# Patient Record
Sex: FEMALE | Race: WHITE | Employment: OTHER | URBAN - METROPOLITAN AREA
[De-identification: names, ages, dates, MRNs, and addresses within clinical notes are randomized per-mention and may not be internally consistent; named-entity substitution may affect disease eponyms.]

---

## 2017-06-07 PROBLEM — M25.531 WRIST PAIN, RIGHT: Status: ACTIVE | Noted: 2017-06-07

## 2017-07-31 PROCEDURE — 81001 URINALYSIS AUTO W/SCOPE: CPT | Performed by: PEDIATRICS

## 2017-07-31 PROCEDURE — 87086 URINE CULTURE/COLONY COUNT: CPT | Performed by: PEDIATRICS

## 2017-08-15 PROCEDURE — 87081 CULTURE SCREEN ONLY: CPT | Performed by: PEDIATRICS

## 2017-08-21 ENCOUNTER — ANESTHESIA EVENT (OUTPATIENT)
Dept: SURGERY | Facility: HOSPITAL | Age: 62
DRG: 460 | End: 2017-08-21
Payer: COMMERCIAL

## 2017-08-21 RX ORDER — GABAPENTIN 600 MG/1
600 TABLET ORAL ONCE
Status: COMPLETED | OUTPATIENT
Start: 2017-08-21 | End: 2017-08-22

## 2017-08-22 ENCOUNTER — SURGERY (OUTPATIENT)
Age: 62
End: 2017-08-22

## 2017-08-22 ENCOUNTER — HOSPITAL ENCOUNTER (INPATIENT)
Facility: HOSPITAL | Age: 62
LOS: 2 days | Discharge: HOME | DRG: 460 | End: 2017-08-24
Attending: ORTHOPAEDIC SURGERY | Admitting: ORTHOPAEDIC SURGERY
Payer: COMMERCIAL

## 2017-08-22 ENCOUNTER — ANESTHESIA (OUTPATIENT)
Dept: SURGERY | Facility: HOSPITAL | Age: 62
DRG: 460 | End: 2017-08-22
Payer: COMMERCIAL

## 2017-08-22 ENCOUNTER — APPOINTMENT (OUTPATIENT)
Dept: GENERAL RADIOLOGY | Facility: HOSPITAL | Age: 62
DRG: 460 | End: 2017-08-22
Attending: ORTHOPAEDIC SURGERY
Payer: COMMERCIAL

## 2017-08-22 DIAGNOSIS — D64.9 ANEMIA, UNSPECIFIED TYPE: Primary | ICD-10-CM

## 2017-08-22 PROBLEM — M43.10 ACQUIRED SPONDYLOLISTHESIS: Status: ACTIVE | Noted: 2017-08-22

## 2017-08-22 LAB
ERYTHROCYTE [DISTWIDTH] IN BLOOD BY AUTOMATED COUNT: 12.2 % (ref 11.5–16)
HCT VFR BLD AUTO: 37.4 % (ref 34–50)
HGB BLD-MCNC: 12.5 G/DL (ref 12–16)
MCH RBC QN AUTO: 30.5 PG (ref 27–33.2)
MCHC RBC AUTO-ENTMCNC: 33.4 G/DL (ref 31–37)
MCV RBC AUTO: 91.2 FL (ref 81–100)
PLATELET # BLD AUTO: 257 10(3)UL (ref 150–450)
RBC # BLD AUTO: 4.1 X10(6)UL (ref 3.8–5.1)
RED CELL DISTRIBUTION WIDTH-SD: 41.1 FL (ref 35.1–46.3)
WBC # BLD AUTO: 11.2 X10(3) UL (ref 4–13)

## 2017-08-22 PROCEDURE — 4A11X4G MONITORING OF PERIPHERAL NERVOUS ELECTRICAL ACTIVITY, INTRAOPERATIVE, EXTERNAL APPROACH: ICD-10-PCS | Performed by: ORTHOPAEDIC SURGERY

## 2017-08-22 PROCEDURE — 0SB40ZZ EXCISION OF LUMBOSACRAL DISC, OPEN APPROACH: ICD-10-PCS | Performed by: ORTHOPAEDIC SURGERY

## 2017-08-22 PROCEDURE — 95940 IONM IN OPERATNG ROOM 15 MIN: CPT | Performed by: ORTHOPAEDIC SURGERY

## 2017-08-22 PROCEDURE — 0SG30AJ FUSION OF LUMBOSACRAL JOINT WITH INTERBODY FUSION DEVICE, POSTERIOR APPROACH, ANTERIOR COLUMN, OPEN APPROACH: ICD-10-PCS | Performed by: ORTHOPAEDIC SURGERY

## 2017-08-22 PROCEDURE — 95938 SOMATOSENSORY TESTING: CPT | Performed by: ORTHOPAEDIC SURGERY

## 2017-08-22 PROCEDURE — 95861 NEEDLE EMG 2 EXTREMITIES: CPT | Performed by: ORTHOPAEDIC SURGERY

## 2017-08-22 PROCEDURE — 76000 FLUOROSCOPY <1 HR PHYS/QHP: CPT | Performed by: ORTHOPAEDIC SURGERY

## 2017-08-22 PROCEDURE — 0SG00AJ FUSION OF LUMBAR VERTEBRAL JOINT WITH INTERBODY FUSION DEVICE, POSTERIOR APPROACH, ANTERIOR COLUMN, OPEN APPROACH: ICD-10-PCS | Performed by: ORTHOPAEDIC SURGERY

## 2017-08-22 PROCEDURE — 85027 COMPLETE CBC AUTOMATED: CPT | Performed by: ORTHOPAEDIC SURGERY

## 2017-08-22 DEVICE — IMPLANTABLE DEVICE: Type: IMPLANTABLE DEVICE | Site: BACK | Status: FUNCTIONAL

## 2017-08-22 DEVICE — IMPLANTABLE DEVICE: Type: IMPLANTABLE DEVICE | Status: FUNCTIONAL

## 2017-08-22 DEVICE — SCREW SET SPNE VRST GRY: Type: IMPLANTABLE DEVICE | Status: FUNCTIONAL

## 2017-08-22 DEVICE — GRAFT BN DMNR FBR 30ML: Type: IMPLANTABLE DEVICE | Site: BACK | Status: FUNCTIONAL

## 2017-08-22 RX ORDER — NALBUPHINE HCL 10 MG/ML
2.5 AMPUL (ML) INJECTION EVERY 4 HOURS PRN
Status: DISCONTINUED | OUTPATIENT
Start: 2017-08-22 | End: 2017-08-23

## 2017-08-22 RX ORDER — DIAZEPAM 10 MG/1
10 TABLET ORAL EVERY 8 HOURS PRN
Status: DISCONTINUED | OUTPATIENT
Start: 2017-08-22 | End: 2017-08-24

## 2017-08-22 RX ORDER — GABAPENTIN 600 MG/1
TABLET ORAL
Status: COMPLETED
Start: 2017-08-22 | End: 2017-08-22

## 2017-08-22 RX ORDER — POLYETHYLENE GLYCOL 3350 17 G/17G
17 POWDER, FOR SOLUTION ORAL DAILY PRN
Status: DISCONTINUED | OUTPATIENT
Start: 2017-08-22 | End: 2017-08-24

## 2017-08-22 RX ORDER — METOCLOPRAMIDE HYDROCHLORIDE 5 MG/ML
10 INJECTION INTRAMUSCULAR; INTRAVENOUS EVERY 6 HOURS PRN
Status: DISCONTINUED | OUTPATIENT
Start: 2017-08-22 | End: 2017-08-24

## 2017-08-22 RX ORDER — OXYCODONE AND ACETAMINOPHEN 10; 325 MG/1; MG/1
1 TABLET ORAL EVERY 4 HOURS PRN
Status: DISCONTINUED | OUTPATIENT
Start: 2017-08-22 | End: 2017-08-24

## 2017-08-22 RX ORDER — CETIRIZINE HYDROCHLORIDE 10 MG/1
10 TABLET ORAL DAILY
Status: DISCONTINUED | OUTPATIENT
Start: 2017-08-23 | End: 2017-08-24

## 2017-08-22 RX ORDER — FLUTICASONE PROPIONATE 50 MCG
2 SPRAY, SUSPENSION (ML) NASAL DAILY
Status: DISCONTINUED | OUTPATIENT
Start: 2017-08-23 | End: 2017-08-24

## 2017-08-22 RX ORDER — ONDANSETRON 2 MG/ML
4 INJECTION INTRAMUSCULAR; INTRAVENOUS EVERY 6 HOURS PRN
Status: DISCONTINUED | OUTPATIENT
Start: 2017-08-23 | End: 2017-08-23

## 2017-08-22 RX ORDER — SODIUM CHLORIDE, SODIUM LACTATE, POTASSIUM CHLORIDE, CALCIUM CHLORIDE 600; 310; 30; 20 MG/100ML; MG/100ML; MG/100ML; MG/100ML
INJECTION, SOLUTION INTRAVENOUS CONTINUOUS
Status: DISCONTINUED | OUTPATIENT
Start: 2017-08-22 | End: 2017-08-24

## 2017-08-22 RX ORDER — DIPHENHYDRAMINE HCL 25 MG
25 CAPSULE ORAL EVERY 4 HOURS PRN
Status: DISCONTINUED | OUTPATIENT
Start: 2017-08-22 | End: 2017-08-23

## 2017-08-22 RX ORDER — LIOTHYRONINE SODIUM 5 UG/1
5 TABLET ORAL
Status: DISCONTINUED | OUTPATIENT
Start: 2017-08-23 | End: 2017-08-24

## 2017-08-22 RX ORDER — NALOXONE HYDROCHLORIDE 0.4 MG/ML
0.08 INJECTION, SOLUTION INTRAMUSCULAR; INTRAVENOUS; SUBCUTANEOUS
Status: DISCONTINUED | OUTPATIENT
Start: 2017-08-22 | End: 2017-08-23

## 2017-08-22 RX ORDER — OXYCODONE HCL 10 MG/1
10 TABLET, FILM COATED, EXTENDED RELEASE ORAL EVERY 12 HOURS
Qty: 60 TABLET | Refills: 0 | Status: SHIPPED | OUTPATIENT
Start: 2017-08-22 | End: 2017-08-29

## 2017-08-22 RX ORDER — ONDANSETRON 2 MG/ML
4 INJECTION INTRAMUSCULAR; INTRAVENOUS AS NEEDED
Status: DISCONTINUED | OUTPATIENT
Start: 2017-08-22 | End: 2017-08-22 | Stop reason: HOSPADM

## 2017-08-22 RX ORDER — NALOXONE HYDROCHLORIDE 0.4 MG/ML
80 INJECTION, SOLUTION INTRAMUSCULAR; INTRAVENOUS; SUBCUTANEOUS AS NEEDED
Status: DISCONTINUED | OUTPATIENT
Start: 2017-08-22 | End: 2017-08-22 | Stop reason: HOSPADM

## 2017-08-22 RX ORDER — OXYCODONE AND ACETAMINOPHEN 10; 325 MG/1; MG/1
1 TABLET ORAL EVERY 4 HOURS PRN
Qty: 80 TABLET | Refills: 1 | Status: SHIPPED | OUTPATIENT
Start: 2017-08-22 | End: 2017-08-29

## 2017-08-22 RX ORDER — BUPIVACAINE HYDROCHLORIDE AND EPINEPHRINE 5; 5 MG/ML; UG/ML
INJECTION, SOLUTION EPIDURAL; INTRACAUDAL; PERINEURAL AS NEEDED
Status: DISCONTINUED | OUTPATIENT
Start: 2017-08-22 | End: 2017-08-22 | Stop reason: HOSPADM

## 2017-08-22 RX ORDER — HYDROMORPHONE HYDROCHLORIDE 1 MG/ML
0.4 INJECTION, SOLUTION INTRAMUSCULAR; INTRAVENOUS; SUBCUTANEOUS EVERY 5 MIN PRN
Status: DISCONTINUED | OUTPATIENT
Start: 2017-08-22 | End: 2017-08-22 | Stop reason: HOSPADM

## 2017-08-22 RX ORDER — DULOXETIN HYDROCHLORIDE 30 MG/1
60 CAPSULE, DELAYED RELEASE ORAL NIGHTLY
Status: DISCONTINUED | OUTPATIENT
Start: 2017-08-22 | End: 2017-08-24

## 2017-08-22 RX ORDER — LEVOTHYROXINE SODIUM 0.1 MG/1
100 TABLET ORAL
Status: DISCONTINUED | OUTPATIENT
Start: 2017-08-23 | End: 2017-08-24

## 2017-08-22 RX ORDER — SENNOSIDES 8.6 MG
17.2 TABLET ORAL NIGHTLY
Status: DISCONTINUED | OUTPATIENT
Start: 2017-08-22 | End: 2017-08-24

## 2017-08-22 RX ORDER — SODIUM PHOSPHATE, DIBASIC AND SODIUM PHOSPHATE, MONOBASIC 7; 19 G/133ML; G/133ML
1 ENEMA RECTAL ONCE AS NEEDED
Status: DISCONTINUED | OUTPATIENT
Start: 2017-08-22 | End: 2017-08-24

## 2017-08-22 RX ORDER — HYDROMORPHONE HYDROCHLORIDE 1 MG/ML
INJECTION, SOLUTION INTRAMUSCULAR; INTRAVENOUS; SUBCUTANEOUS
Status: COMPLETED
Start: 2017-08-22 | End: 2017-08-22

## 2017-08-22 RX ORDER — GENTAMICIN SULFATE 40 MG/ML
INJECTION, SOLUTION INTRAMUSCULAR; INTRAVENOUS AS NEEDED
Status: DISCONTINUED | OUTPATIENT
Start: 2017-08-22 | End: 2017-08-22 | Stop reason: HOSPADM

## 2017-08-22 RX ORDER — ATORVASTATIN CALCIUM 20 MG/1
20 TABLET, FILM COATED ORAL NIGHTLY
Status: DISCONTINUED | OUTPATIENT
Start: 2017-08-22 | End: 2017-08-24

## 2017-08-22 RX ORDER — OXYCODONE HYDROCHLORIDE 10 MG/1
10 TABLET ORAL ONCE AS NEEDED
Status: ACTIVE | OUTPATIENT
Start: 2017-08-22 | End: 2017-08-22

## 2017-08-22 RX ORDER — LEVOTHYROXINE SODIUM 0.1 MG/1
100 TABLET ORAL
COMMUNITY
End: 2018-05-08

## 2017-08-22 RX ORDER — ONDANSETRON 2 MG/ML
4 INJECTION INTRAMUSCULAR; INTRAVENOUS EVERY 4 HOURS PRN
Status: DISPENSED | OUTPATIENT
Start: 2017-08-22 | End: 2017-08-23

## 2017-08-22 RX ORDER — DIPHENHYDRAMINE HYDROCHLORIDE 50 MG/ML
25 INJECTION INTRAMUSCULAR; INTRAVENOUS EVERY 4 HOURS PRN
Status: DISCONTINUED | OUTPATIENT
Start: 2017-08-22 | End: 2017-08-23

## 2017-08-22 RX ORDER — HYDROMORPHONE HYDROCHLORIDE 1 MG/ML
0.4 INJECTION, SOLUTION INTRAMUSCULAR; INTRAVENOUS; SUBCUTANEOUS EVERY 30 MIN PRN
Status: DISCONTINUED | OUTPATIENT
Start: 2017-08-22 | End: 2017-08-23

## 2017-08-22 RX ORDER — BISACODYL 10 MG
10 SUPPOSITORY, RECTAL RECTAL
Status: DISCONTINUED | OUTPATIENT
Start: 2017-08-22 | End: 2017-08-24

## 2017-08-22 RX ORDER — DOCUSATE SODIUM 100 MG/1
100 CAPSULE, LIQUID FILLED ORAL 2 TIMES DAILY
Status: DISCONTINUED | OUTPATIENT
Start: 2017-08-22 | End: 2017-08-24

## 2017-08-22 RX ORDER — METHYLPREDNISOLONE ACETATE 80 MG/ML
INJECTION, SUSPENSION INTRA-ARTICULAR; INTRALESIONAL; INTRAMUSCULAR; SOFT TISSUE AS NEEDED
Status: DISCONTINUED | OUTPATIENT
Start: 2017-08-22 | End: 2017-08-22 | Stop reason: HOSPADM

## 2017-08-22 RX ORDER — DIPHENHYDRAMINE HYDROCHLORIDE 50 MG/ML
12.5 INJECTION INTRAMUSCULAR; INTRAVENOUS EVERY 4 HOURS PRN
Status: DISCONTINUED | OUTPATIENT
Start: 2017-08-22 | End: 2017-08-23

## 2017-08-22 RX ORDER — CEFAZOLIN SODIUM 1 G/3ML
INJECTION, POWDER, FOR SOLUTION INTRAMUSCULAR; INTRAVENOUS
Status: DISCONTINUED | OUTPATIENT
Start: 2017-08-22 | End: 2017-08-22 | Stop reason: HOSPADM

## 2017-08-22 NOTE — ANESTHESIA POSTPROCEDURE EVALUATION
[address] Patient Status:  Surgery Admit   Age/Gender 60year old female MRN [id number]   Location [address] [facility] Attending Leon Sheldon MD   Hosp Day # 0 PCP Antonia Sheikh MD       Anesthesia

## 2017-08-22 NOTE — ANESTHESIA PREPROCEDURE EVALUATION
PRE-OP EVALUATION    Patient Name: Ruby Parks    Pre-op Diagnosis: SPINAL STENOSIS, SPONDHYLOLISTHOSIS    Procedure(s):  LAMINECTOMY AND FORAMINOTOMY DECOMPRESSION LUMBAR 4-5, LUMBAR 5/SACRUM 1 WITH POSTERIOR LATERAL INTERBODY FUSION WITH INSTRUMEN [] OxyCODONE HCl ER 30 MG Oral Tablet Extended Release 12 hour Abuse-Deterrent Take 1 tablet by mouth 2 (two) times daily. Disp: 60 each Rfl: 0   [DISCONTINUED] DULoxetine HCl 60 MG Oral Cap DR Particles Take 60 mg by mouth nightly.    Disp:  Rfl: 1 Quit date: 1/1/2004    Smokeless tobacco: Never Used    Alcohol use Yes    Comment: occasionally       Drug use: No     Available pre-op labs reviewed.     Lab Results  Component Value Date   WBC 6.50 07/31/2017   RBC 4.35 07/31/2017   HGB 13.6 07/31/2017

## 2017-08-22 NOTE — OPERATIVE REPORT
Operative Report    [address] Patient Status:  Surgery Admit    1955 MRN [id number]   Location [facility] Attending Mel Sanchez MD   Hosp Day # 0 PCP MD Elaine Munguia  20 We confirmed that he had quit smoking more than 8 weeks ago and will stay off nicotine for at least 6- 8 months postop. TECHNIQUE: The patient was brought to the operating room, underwent intubation. Bilateral TEDs, SCDs and monitoring leads were placed. dural sac and exiting nerve root with a nerve root retractor. The bone spurs and scar which were offending the exiting nerve rtoots were resected with a Kerrison.  I used a blunt mcdonald ball, currettes and bipolar electrocautery to mobiize the dural sac and dural leak either. I removed the tubular retractor. We obtained AP and lateral fluoroscopy, confirmed the L5S1 Level anatomy.  We irrigated after performing the skin incision over the segment, dissected through subcutaneous tissue with Bovie electr there was a void in the disc space. I used a laminaspreader gently at theL5 S1 interval and confirmed instability of the L5 [id number] segment.  Based on the  amount of facet resection on the right  ,100 percent, the disc pathology with a void in the disc pac tightened. We removed the tap extenders and then irrigated. We closed the fascia with single interrupted Vicryl running stitches, subcutaneous layer with 2-0 Vicryl, skin with Monocryl. We applied a sterile dressing.  Patient had strong ankle and dorsi plan

## 2017-08-22 NOTE — H&P
Spine Surgery H&P  Dr. Lore Heck Patient Status:  Surgery Admit    1955 MRN [id number]   Location [address] [place] [address] Attending Michael Arenas MD   Hosp Day # 0 PCP MD Princess Lion weight 185 lb 10 oz (84.2 kg), SpO2 99 %.       Intake/Output:  No intake or output data in the 24 hours ending 17 0732      Past Medical History:   Diagnosis Date   • Anesthesia complication     headache s/p    • Anxiety state    • Asthma hematuria. no increase in night urination no in-ablitity to completely empty bladder  NEURO: no balance problems, no seizure problems, no increased clumsiness, dropping things, no tremor,  no headaches/ migraines  PSYCHE: no depression, no anxiety.   HEMATO not palpable. Extremities:  No lower extremity edema noted. Without clubbing or cyanosis. Capillary refill < 2 seconds. Skin: Normal texture and turgor. Lymphatic:  No palpable cervical lymphadenopathy.   Neurologic: Cranial nerves II- XII are grossly

## 2017-08-22 NOTE — CONSULTS
[facility] Pharmacy Note:  Pain Consult    Yesenia Gaffney is a 60year old female started on Dilaudid PCA by Dr. Valente Perales. Pharmacy was consulted to review medication profile and to discontinue previously ordered narcotics and sedatives.     Medication pr

## 2017-08-22 NOTE — CONSULTS
[facility] Hospitalist Initial Consult       Reason for consult: Medical Management sp laminectomy and foraminotomy decompression L4-5, L5-S1 with posterior lateral interbody fusion with instrumentation      History of Present Illness: Patient is a 60year old fe Cigarettes    Quit date: 1/1/2004    Smokeless tobacco: Never Used    Alcohol use Yes    Comment: occasionally        Fam Hx  Family History   Problem Relation Age of Onset   • High Blood Pressure Father        Review of Systems  All 10 systems otherwise r posterior lateral interbody fusion with instrumentation  - Plan per ortho. Continue PT/OT. Pain is currently controlled. SCD's for DVT prophylaxis.    -CBC without post-operative anemia    HL  -statin    Hypothyroidism  -continue home meds    Allergies

## 2017-08-23 LAB
ERYTHROCYTE [DISTWIDTH] IN BLOOD BY AUTOMATED COUNT: 12.1 % (ref 11.5–16)
HCT VFR BLD AUTO: 37.9 % (ref 34–50)
HGB BLD-MCNC: 12.9 G/DL (ref 12–16)
MCH RBC QN AUTO: 30.4 PG (ref 27–33.2)
MCHC RBC AUTO-ENTMCNC: 34 G/DL (ref 31–37)
MCV RBC AUTO: 89.4 FL (ref 81–100)
PLATELET # BLD AUTO: 283 10(3)UL (ref 150–450)
RBC # BLD AUTO: 4.24 X10(6)UL (ref 3.8–5.1)
RED CELL DISTRIBUTION WIDTH-SD: 39.9 FL (ref 35.1–46.3)
WBC # BLD AUTO: 16.6 X10(3) UL (ref 4–13)

## 2017-08-23 PROCEDURE — 97165 OT EVAL LOW COMPLEX 30 MIN: CPT

## 2017-08-23 PROCEDURE — 97530 THERAPEUTIC ACTIVITIES: CPT

## 2017-08-23 PROCEDURE — 97161 PT EVAL LOW COMPLEX 20 MIN: CPT

## 2017-08-23 PROCEDURE — 85027 COMPLETE CBC AUTOMATED: CPT | Performed by: ORTHOPAEDIC SURGERY

## 2017-08-23 PROCEDURE — 97116 GAIT TRAINING THERAPY: CPT

## 2017-08-23 RX ORDER — OXYCODONE HCL 20 MG/1
20 TABLET, FILM COATED, EXTENDED RELEASE ORAL EVERY 12 HOURS
Status: DISCONTINUED | OUTPATIENT
Start: 2017-08-23 | End: 2017-08-23

## 2017-08-23 RX ORDER — OXYCODONE AND ACETAMINOPHEN 10; 325 MG/1; MG/1
2 TABLET ORAL EVERY 4 HOURS PRN
Status: DISCONTINUED | OUTPATIENT
Start: 2017-08-23 | End: 2017-08-24

## 2017-08-23 RX ORDER — OXYCODONE HCL 10 MG/1
10 TABLET, FILM COATED, EXTENDED RELEASE ORAL ONCE
Status: COMPLETED | OUTPATIENT
Start: 2017-08-23 | End: 2017-08-23

## 2017-08-23 RX ORDER — NALBUPHINE HCL 10 MG/ML
2.5 AMPUL (ML) INJECTION EVERY 4 HOURS PRN
Status: DISCONTINUED | OUTPATIENT
Start: 2017-08-23 | End: 2017-08-24

## 2017-08-23 RX ORDER — DIPHENHYDRAMINE HYDROCHLORIDE 50 MG/ML
12.5 INJECTION INTRAMUSCULAR; INTRAVENOUS EVERY 4 HOURS PRN
Status: DISCONTINUED | OUTPATIENT
Start: 2017-08-23 | End: 2017-08-24

## 2017-08-23 RX ORDER — ONDANSETRON 2 MG/ML
4 INJECTION INTRAMUSCULAR; INTRAVENOUS EVERY 6 HOURS PRN
Status: DISCONTINUED | OUTPATIENT
Start: 2017-08-23 | End: 2017-08-23

## 2017-08-23 RX ORDER — ONDANSETRON 2 MG/ML
4 INJECTION INTRAMUSCULAR; INTRAVENOUS
Status: DISCONTINUED | OUTPATIENT
Start: 2017-08-23 | End: 2017-08-24

## 2017-08-23 RX ORDER — NALOXONE HYDROCHLORIDE 0.4 MG/ML
0.08 INJECTION, SOLUTION INTRAMUSCULAR; INTRAVENOUS; SUBCUTANEOUS
Status: DISCONTINUED | OUTPATIENT
Start: 2017-08-23 | End: 2017-08-24

## 2017-08-23 RX ORDER — SCOLOPAMINE TRANSDERMAL SYSTEM 1 MG/1
1 PATCH, EXTENDED RELEASE TRANSDERMAL
Status: DISCONTINUED | OUTPATIENT
Start: 2017-08-23 | End: 2017-08-24

## 2017-08-23 NOTE — PROGRESS NOTES
[facility]  Report of Consultation    Olga Post Patient Status:  Inpatient    1955 MRN [id number]   [facility] 3SW-A Attending Julia Andrade MD   Hosp Day # 1 PCP Marcus Mclaughlin MD     Date of Admission:  2017 pack-year smoking history. She has never used smokeless tobacco. She reports that she drinks alcohol. She reports that she does not use drugs.     Allergies:    Nuts                    Swelling    Comment:Almonds-throat swelling             Walnuts-mouth so diazepam (VALIUM) tab 10 mg, 10 mg, Oral, Q8H PRN  •  oxyCODONE-acetaminophen (PERCOCET)  MG per tab 1 tablet, 1 tablet, Oral, Q4H PRN  •  cetirizine (ZYRTEC) tab 10 mg, 10 mg, Oral, Daily  •  DULoxetine HCl (CYMBALTA) DR particles cap 60 mg, 60 mg, Osteoarthritis     Wrist pain, right     Abnormal electrocardiography     Dog bite     Finger injury     High risk medication use     Menopausal flushing     Impaired fasting glucose     Menopausal symptom     Migraine     Neck pain     Class 1 obesity wit

## 2017-08-23 NOTE — CONSULTS
[facility] Pharmacy Note:  Pain Consult    Angelo Reyes is a 60year old female started on Fentanyl PCA by Dr. Berenice Stovall. Pharmacy was consulted to review medication profile and to discontinue previously ordered narcotics and sedatives.     Medication profi

## 2017-08-23 NOTE — PROGRESS NOTES
[facility] Hospitalist Progress Note                                                                   [address]  1/7/1955    CC: F/U s/p lumbar spine surgery    SUBJECTIVE:    Pain not co ENEMA, ondansetron HCl, Metoclopramide HCl, diphenhydrAMINE **OR** DiphenhydrAMINE HCl, diazepam, oxyCODONE-acetaminophen, ondansetron HCl, Naloxone HCl, DiphenhydrAMINE HCl, Nalbuphine HCl, HYDROmorphone HCl PF, Linaclotide    Assessment/Plan:  Active Pro

## 2017-08-23 NOTE — PLAN OF CARE
PT A/O, DRESSING TO BACK D/I, DENIES N/T, 94% ON 3L, USING IS, SCD/TEDS ON, CAMPOS DRAINING YELLOW URINE, C/O OF SEVERE BACK PAIN, USED PCA IN EVENING THEN C/O OF NAUSEA, GIVEN REGLAN AND ZOFRAN WITH LITTLE RELIEF, NO PAIN RELIEF FROM PERCOCET OR VALIUM, ST

## 2017-08-23 NOTE — OCCUPATIONAL THERAPY NOTE
OCCUPATIONAL THERAPY EVALUATION - INPATIENT     Room Number: 352/352-A  Evaluation Date: 8/23/2017  Type of Evaluation: Initial  Presenting Problem:  (L4-5 lami c fusion;  L5-S1 revision lami posterior fusion)    Physician Order: IP Consult to Occupational OBJECTIVE  Precautions: Lumbar brace;Spine  Fall Risk: High fall risk    WEIGHT BEARING RESTRICTION  Weight Bearing Restriction: None                PAIN ASSESSMENT  Ratin  Location: low back  Management Techniques:  Activity promotion;Repositioning ambulate with min A approx 50 feet. Pt complaining of R calf pain, Nurse updated. OT educated patient on potential need for AE/DME at discharge as well as plan of care for OT while in house. Patient End of Session: Up in chair; With [facility] staff;Needs met; Ca eval/education; Compensatory technique education  Rehab Potential : Good  Frequency (Obs): Daily  Number of Visits to Meet Established Goals: 1    ADL Goals  Patient will perform upper body dressing:  with supervision  Patient will perform lower body dressi

## 2017-08-23 NOTE — PHYSICAL THERAPY NOTE
PHYSICAL THERAPY EVALUATION - INPATIENT     Room Number: 352/352-A  Evaluation Date: 8/23/2017  Type of Evaluation: Initial  Physician Order: PT Eval and Treat    Presenting Problem: S/p Laminectomy and foraminotomy decompression L4-5, L5-S1 with poste Home Layout: Two level; Able to live on main level  Stairs to Enter : 2     Stairs to Bedroom: 14  Railing: Yes    Lives With: Significant other  Drives: Yes  Patient Owned Equipment: None  Patient Regularly Uses: Reading glasses    Prior Level of Indepen Modifier (G-Code): CK    FUNCTIONAL ABILITY STATUS  Gait Assessment   Gait Assistance: Maximum assistance (per FIM: CGA actual)  Distance (ft): 50  Assistive Device: Rolling walker  Pattern: Shuffle  Stoop/Curb Assistance: Not tested  Comment : Pain is bet on both pain surface and stairs. Pt has currently a spinal restriction with mobilities and is to wear and LSO when up.  The patient is below her baseline and would benefit from skilled inpatient PT to address the above deficits to assist patient in returnin

## 2017-08-23 NOTE — PROGRESS NOTES
[facility]  Progress Note    Fanyalvaro Grant Ericksergio Patient Status:  Outpatient in a Bed    1955 MRN [id number]   [facility] 3SW-A Attending Feng Santizo MD   Hosp Day # 0 PCP Sherron Sandhu MD     Subjective:  Daya Carreon spine.  No JVD. Supple. Lungs: Clear to auscultation bilaterally. Cardiac: Regular rate and rhythm. No murmur. Abdomen:  Soft, non-distended, non-tender, with no rebound or guarding. No peritoneal signs. No ascites. Liver is within normal limits.   Sp zoster     Osteoarthritis     Wrist pain, right     Abnormal electrocardiography     Dog bite     Finger injury     High risk medication use     Menopausal flushing     Impaired fasting glucose     Menopausal symptom     Migraine     Neck pain     Class 1

## 2017-08-24 VITALS
HEIGHT: 63.5 IN | DIASTOLIC BLOOD PRESSURE: 57 MMHG | BODY MASS INDEX: 32.48 KG/M2 | WEIGHT: 185.63 LBS | TEMPERATURE: 98 F | RESPIRATION RATE: 16 BRPM | HEART RATE: 71 BPM | SYSTOLIC BLOOD PRESSURE: 111 MMHG | OXYGEN SATURATION: 96 %

## 2017-08-24 LAB
ERYTHROCYTE [DISTWIDTH] IN BLOOD BY AUTOMATED COUNT: 12.8 % (ref 11.5–16)
HCT VFR BLD AUTO: 31.9 % (ref 34–50)
HGB BLD-MCNC: 10.4 G/DL (ref 12–16)
MCH RBC QN AUTO: 30.4 PG (ref 27–33.2)
MCHC RBC AUTO-ENTMCNC: 32.6 G/DL (ref 31–37)
MCV RBC AUTO: 93.3 FL (ref 81–100)
PLATELET # BLD AUTO: 228 10(3)UL (ref 150–450)
RBC # BLD AUTO: 3.42 X10(6)UL (ref 3.8–5.1)
RED CELL DISTRIBUTION WIDTH-SD: 43.7 FL (ref 35.1–46.3)
WBC # BLD AUTO: 12.8 X10(3) UL (ref 4–13)

## 2017-08-24 PROCEDURE — 97116 GAIT TRAINING THERAPY: CPT

## 2017-08-24 PROCEDURE — 97535 SELF CARE MNGMENT TRAINING: CPT

## 2017-08-24 PROCEDURE — 85027 COMPLETE CBC AUTOMATED: CPT | Performed by: HOSPITALIST

## 2017-08-24 PROCEDURE — 97530 THERAPEUTIC ACTIVITIES: CPT

## 2017-08-24 RX ORDER — POLYETHYLENE GLYCOL 3350 17 G/17G
17 POWDER, FOR SOLUTION ORAL DAILY PRN
Qty: 10 EACH | Refills: 0 | Status: SHIPPED | COMMUNITY
Start: 2017-08-24 | End: 2017-08-29

## 2017-08-24 RX ORDER — PSEUDOEPHEDRINE HCL 30 MG
TABLET ORAL
Qty: 60 CAPSULE | Refills: 0 | Status: SHIPPED | COMMUNITY
Start: 2017-08-24 | End: 2017-08-29

## 2017-08-24 NOTE — PROGRESS NOTES
Pain Service  POD2 s/p lumbar laminectomy and foraminotomy decompression L4-5, L5/S1 posterior lateral interbody fusion with instrumentation.    Pain is well controlled off PCA - sitting in chair - rates pain 5/10 across low back  Currently on home pain med

## 2017-08-24 NOTE — PROGRESS NOTES
PCA started with this late morning with relief per patient, only used once. Scopolamine patch applied behind left ear. Zofran and compazine given as scheduled, denies nausea/emesis. Ambulating in hallways. Percocet given prn. Reports relief overall.  Will m

## 2017-08-24 NOTE — PROGRESS NOTES
Patient and  (spouse)  attended discharge spine education/snack class. Printed discharge education sheet provided and reviewed. Teach back done. Questions solicited and answered. Tolerated activity well.

## 2017-08-24 NOTE — PLAN OF CARE
PT A/O, DENIES NAUSEA, TOLERATING MEALS, DRESSING TO BACK, D/I, SR, SCDS/TEDS ON, NUMBNESS TO RT THIGH, USING IS, VOIDING IN BATHROOM, SLEEPING MOST OF NIGHT, UP WITH BRACE, USED PCA 3 TIMES/LAST 12 HOURS, TAKING SCHEDULED PAIN MEDS WITH RELIEF EXCEPT WHEN

## 2017-08-24 NOTE — OCCUPATIONAL THERAPY NOTE
OCCUPATIONAL THERAPY TREATMENT NOTE - INPATIENT     Room Number: 352/352-A  Session: 1/1  Number of Visits to Meet Established Goals: 1    Presenting Problem:  (L4-5 lami c fusion;  L5-S1 revision lami posterior fusion)      History related to current admis urinal? : None  -   Putting on and taking off regular upper body clothing?: None  -   Taking care of personal grooming such as brushing teeth?: None  -   Eating meals?: None    AM-PAC Score:  Score: 23  Approx Degree of Impairment: 15.86%  Standardized Sco Treatment Plan: Energy conservation/work simplification techniques;ADL training;Functional transfer training; Endurance training;Patient/Family training;Equipment eval/education; Compensatory technique education  Rehab Potential : Good  Frequency (Obs): Tamica

## 2017-08-24 NOTE — PROGRESS NOTES
[facility] Hospitalist Progress Note                                                                   [address]  1/7/1955    CC: F/U s/p lumbar spine surgery    SUBJECTIVE:    Pain better DiphenhydrAMINE HCl, Nalbuphine HCl, fentaNYL citrate, PEG 3350, magnesium hydroxide, bisacodyl, FLEET ENEMA, Metoclopramide HCl, diazepam, oxyCODONE-acetaminophen, Linaclotide    Assessment/Plan:  Active Problems:    Acquired spondylolisthesis      sp hardin

## 2017-08-24 NOTE — PHYSICAL THERAPY NOTE
PHYSICAL THERAPY TREATMENT NOTE - INPATIENT    Room Number: 352/352-A     Session: 1  Number of Visits to Meet Established Goals: 5    Presenting Problem: S/p Laminectomy and foraminotomy decompression L4-5, L5-S1 with posterior laterlainterbody fusion wi PAIN ASSESSMENT   Ratin  Location: head and lower back       BALANCE                                                                                                                     Static Sitting: Normal  Dynamic Sitting: Normal           St seated and discussedthe importance of  positioning at home . Nursing is aware of this visit. Patient End of Session: Up in chair; With [address] staff;Needs met;Call light within reach;RN aware of session/findings; All patient questions and concerns addressed

## 2017-08-24 NOTE — PROGRESS NOTES
[facility]  Progress Note    Vicki Post Patient Status:  Inpatient    1955 MRN [id number]   [facility] 3SW-A Attending Marycruz Jones MD   Hosp Day # 2 PCP Darus Gowers, MD     Subjective:  Cory Clay is a(n) No JVD. Supple. Lungs: Clear to auscultation bilaterally. Cardiac: Regular rate and rhythm. No murmur. Abdomen:  Soft, non-distended, non-tender, with no rebound or guarding. No peritoneal signs. No ascites. Liver is within normal limits.   Spleen is serious comorbidity and body mass index (BMI) of 30.0 to 30.9 in adult, unspecified obesity type     Palpitations     Plantar fasciitis     Hyperhidrosis     Tinnitus     Acquired spondylolisthesis        Mechanical DVT prophylaxis (VERONICA/ SCDs)  Mobilize pa

## 2017-08-29 PROBLEM — M25.531 WRIST PAIN, RIGHT: Status: RESOLVED | Noted: 2017-06-07 | Resolved: 2017-08-29

## 2017-08-31 NOTE — DISCHARGE SUMMARY
Discharge Summary  Patient ID:  Cory Clay  [id number]  60year old  1/7/1955    Admit date: 8/22/2017    Discharge date and time: 8/24/17     Attending Physician: No att. providers found     Reason for admission: postop    Discharge Diagnoses: SPIN medications which have NOT CHANGED    Levothyroxine Sodium 100 MCG Oral Tab  Take 100 mcg by mouth before breakfast., Historical    !! OxyCODONE HCl ER 30 MG Oral Tablet Extended Release 12 hour Abuse-Deterrent  Take 1 tablet by mouth 2 (two) times daily. ,

## 2017-12-20 PROBLEM — G89.29 CHRONIC PAIN OF LEFT KNEE: Status: ACTIVE | Noted: 2017-12-20

## 2017-12-20 PROBLEM — M19.90 ARTHRITIS: Status: ACTIVE | Noted: 2017-12-20

## 2017-12-20 PROBLEM — M25.562 CHRONIC PAIN OF LEFT KNEE: Status: ACTIVE | Noted: 2017-12-20

## 2018-05-08 PROBLEM — M19.90 ARTHRITIS: Status: RESOLVED | Noted: 2017-12-20 | Resolved: 2018-05-08

## 2018-05-08 PROBLEM — M25.531 PAIN IN RIGHT WRIST: Status: ACTIVE | Noted: 2017-06-07

## 2018-05-31 PROCEDURE — 80307 DRUG TEST PRSMV CHEM ANLYZR: CPT | Performed by: INTERNAL MEDICINE

## 2018-08-31 PROBLEM — M18.11 ARTHRITIS OF CARPOMETACARPAL (CMC) JOINT OF RIGHT THUMB: Status: ACTIVE | Noted: 2018-08-31

## 2019-03-06 PROBLEM — R73.9 HYPERGLYCEMIA: Status: ACTIVE | Noted: 2019-03-06

## 2020-11-24 PROBLEM — G31.9 BRAIN ATROPHY (HCC): Status: ACTIVE | Noted: 2020-11-24

## 2020-11-24 PROBLEM — F33.41 RECURRENT MAJOR DEPRESSIVE DISORDER, IN PARTIAL REMISSION (HCC): Status: ACTIVE | Noted: 2020-11-24

## (undated) DEVICE — NEEDLE SPNL 8GA BVL TIP

## (undated) DEVICE — VIOLET BRAIDED (POLYGLACTIN 910), SYNTHETIC ABSORBABLE SUTURE: Brand: COATED VICRYL

## (undated) DEVICE — SUTURE VICRYL 2-0 CP-2

## (undated) DEVICE — GLOVE ORTHO ALOETOUCH SZ 7

## (undated) DEVICE — KENDALL SCD EXPRESS SLEEVES, THIGH LENGTH, MEDIUM: Brand: KENDALL SCD

## (undated) DEVICE — GLOVE SURG TRIUMPH SZ 9

## (undated) DEVICE — 3.0MM PRECISION NEURO (MATCH HEAD)

## (undated) DEVICE — WRAP COOLING BACK W/NO PILLOW

## (undated) DEVICE — LIGHT HANDLE

## (undated) DEVICE — Device

## (undated) DEVICE — SHEET,DRAPE,70X100,STERILE: Brand: MEDLINE

## (undated) DEVICE — SOL  .9 1000ML BTL

## (undated) DEVICE — FLOSEAL SEALENT STERILE 10ML

## (undated) DEVICE — LAMINECTOMY CDS: Brand: MEDLINE INDUSTRIES, INC.

## (undated) DEVICE — 3M™ STERI-DRAPE™ U-DRAPE 1015: Brand: STERI-DRAPE™

## (undated) DEVICE — EVEREST 1 LEVEL KIT 1.4MM STRL

## (undated) DEVICE — SCALPEL .063IN KT PRFCT SRG

## (undated) DEVICE — C-ARMOR C-ARM EQUIPMENT COVERS CLEAR STERILE UNIVERSAL FIT 12 PER CASE: Brand: C-ARMOR

## (undated) DEVICE — 3M(TM) TEGADERM(TM) TRANSPARENT FILM DRESSING FRAME STYLE 1628: Brand: 3M™ TEGADERM™

## (undated) DEVICE — EVEREST ADD ON KIT 1.4MM

## (undated) DEVICE — COVER,TABLE,HVY DUTY,EXT,77"X96",STRL: Brand: MEDLINE

## (undated) DEVICE — LAPAROTOMY SPONGE - RF AND X-RAY DETECTABLE PRE-WASHED: Brand: SITUATE

## (undated) DEVICE — GLOVE SURG TRIUMPH SZ 7

## (undated) DEVICE — DRAPE TABLE COVER 44X90 TC-10

## (undated) DEVICE — DRAPE C-ARM UNIVERSAL

## (undated) DEVICE — DRILL SRG OIL CRTDG MAESTRO

## (undated) DEVICE — TRANSPOSAL ULTRAFLEX DUO/QUAD ULTRA CART MANIFOLD

## (undated) DEVICE — GLOVE BIOGEL M SURG SZ 9

## (undated) DEVICE — SUTURE MONOCRYL 3-0 PS-2

## (undated) NOTE — LETTER
[facility] Testing Department  Phone: [phone]  Right Fax: [phone]  ABNORMAL VALUES FAX    Sent By:  Calvin Valentin RN Date: 8/11/17    Patient Name: Lilo Edson  Surgery Date: 8/22/2017    CSN: [id number]  Medical Record: [id number]

## (undated) NOTE — LETTER
[facility] Department  Phone: [phone]  OUTSIDE TESTING RESULT REQUEST      TO:   Dr. Taran Gutierrez and staff Today's Date: 7/14/17    FAX #: [phone]     IMPORTANT: FOR YOUR IMMEDIATE ATTENTION  Please FAX all test results listed patito